# Patient Record
Sex: FEMALE | Race: ASIAN | NOT HISPANIC OR LATINO
[De-identification: names, ages, dates, MRNs, and addresses within clinical notes are randomized per-mention and may not be internally consistent; named-entity substitution may affect disease eponyms.]

---

## 2018-06-18 PROBLEM — Z00.00 ENCOUNTER FOR PREVENTIVE HEALTH EXAMINATION: Status: ACTIVE | Noted: 2018-06-18

## 2018-06-19 ENCOUNTER — APPOINTMENT (OUTPATIENT)
Dept: OBGYN | Facility: CLINIC | Age: 38
End: 2018-06-19
Payer: COMMERCIAL

## 2018-06-19 ENCOUNTER — ASOB RESULT (OUTPATIENT)
Age: 38
End: 2018-06-19

## 2018-06-19 ENCOUNTER — TRANSCRIPTION ENCOUNTER (OUTPATIENT)
Age: 38
End: 2018-06-19

## 2018-06-19 VITALS
SYSTOLIC BLOOD PRESSURE: 131 MMHG | HEIGHT: 62 IN | BODY MASS INDEX: 28.17 KG/M2 | DIASTOLIC BLOOD PRESSURE: 82 MMHG | HEART RATE: 71 BPM | WEIGHT: 153.06 LBS

## 2018-06-19 DIAGNOSIS — Z78.9 OTHER SPECIFIED HEALTH STATUS: ICD-10-CM

## 2018-06-19 PROCEDURE — 99203 OFFICE O/P NEW LOW 30 MIN: CPT

## 2018-06-19 PROCEDURE — 76817 TRANSVAGINAL US OBSTETRIC: CPT

## 2018-06-19 RX ORDER — MULTIVITAMIN
TABLET ORAL
Refills: 0 | Status: ACTIVE | COMMUNITY

## 2018-06-20 ENCOUNTER — TRANSCRIPTION ENCOUNTER (OUTPATIENT)
Age: 38
End: 2018-06-20

## 2018-06-20 ENCOUNTER — OUTPATIENT (OUTPATIENT)
Dept: OUTPATIENT SERVICES | Facility: HOSPITAL | Age: 38
LOS: 1 days | End: 2018-06-20
Payer: COMMERCIAL

## 2018-06-20 VITALS
TEMPERATURE: 98 F | WEIGHT: 151.9 LBS | DIASTOLIC BLOOD PRESSURE: 68 MMHG | HEIGHT: 62 IN | SYSTOLIC BLOOD PRESSURE: 102 MMHG | HEART RATE: 62 BPM | RESPIRATION RATE: 16 BRPM | OXYGEN SATURATION: 99 %

## 2018-06-20 DIAGNOSIS — O02.1 MISSED ABORTION: ICD-10-CM

## 2018-06-20 LAB
BLD GP AB SCN SERPL QL: NEGATIVE — SIGNIFICANT CHANGE UP
HCT VFR BLD CALC: 37.9 % — SIGNIFICANT CHANGE UP (ref 34.5–45)
HGB BLD-MCNC: 11.8 G/DL — SIGNIFICANT CHANGE UP (ref 11.5–15.5)
MCHC RBC-ENTMCNC: 24.7 PG — LOW (ref 27–34)
MCHC RBC-ENTMCNC: 31.1 % — LOW (ref 32–36)
MCV RBC AUTO: 79.3 FL — LOW (ref 80–100)
NRBC # FLD: 0 — SIGNIFICANT CHANGE UP
PLATELET # BLD AUTO: 282 K/UL — SIGNIFICANT CHANGE UP (ref 150–400)
PMV BLD: 11.1 FL — SIGNIFICANT CHANGE UP (ref 7–13)
RBC # BLD: 4.78 M/UL — SIGNIFICANT CHANGE UP (ref 3.8–5.2)
RBC # FLD: 14.2 % — SIGNIFICANT CHANGE UP (ref 10.3–14.5)
RH IG SCN BLD-IMP: POSITIVE — SIGNIFICANT CHANGE UP
WBC # BLD: 9.09 K/UL — SIGNIFICANT CHANGE UP (ref 3.8–10.5)
WBC # FLD AUTO: 9.09 K/UL — SIGNIFICANT CHANGE UP (ref 3.8–10.5)

## 2018-06-20 PROCEDURE — 93010 ELECTROCARDIOGRAM REPORT: CPT | Mod: 76

## 2018-06-20 RX ORDER — SODIUM CHLORIDE 9 MG/ML
1000 INJECTION, SOLUTION INTRAVENOUS
Qty: 0 | Refills: 0 | Status: DISCONTINUED | OUTPATIENT
Start: 2018-06-21 | End: 2018-06-22

## 2018-06-20 NOTE — H&P PST ADULT - LYMPHATIC
posterior cervical R/anterior cervical R/posterior cervical L/supraclavicular R/supraclavicular L/anterior cervical L

## 2018-06-20 NOTE — H&P PST ADULT - ACTIVITY
pt denies routine exercise ; pt is active pt denies routine exercise ; pt can walk 5 blocks last 6/19/18

## 2018-06-20 NOTE — H&P PST ADULT - PROBLEM SELECTOR PLAN 1
Dilation Vacuum Curettage    Pre op instructions given to pt pt appears to have a good understanding of pre op instructions Dilation Vacuum Curettage    Pre op instructions given to pt pt appears to have a good understanding of pre op instructions      reports h/o back, abdominal and chest pain occurring 6/19/18 lasting less than 5 minutes   pt also reports abdominal cramping ; pt denies bleeding  pt denies other c/o pain @ present ; pt in nad vss  ekg done 6/20/18    Reviewed with Dr Eldridge  Pt refused evaluation in Emergency Dept ; pt denies c/o at present    Pt instructed any further c/o ; go to nearest ED ; pt in agreement    Pt does not have a pcp Dilation Vacuum Curettage    Pre op instructions given to pt ;  pt appears to have a good understanding of pre op instructions      pt reports h/o back, abdominal and chest pain occurring 6/19/18 lasting "  less than 5 minutes " pt denies sob, denies palpitations   pt also reports abdominal cramping ; pt denies bleeding  pt denies other c/o pain @ present ; pt in nad vss  ekg done 6/20/18    Reviewed with Dr Eldridge  Pt offered  evaluation in Emergency Dept ;pt refused ;  pt denies c/o at present    Pt instructed any further c/o ; go to nearest ED ; pt in agreement    Pt does not have a pcp Dilation Vacuum Curettage    Pre op instructions given to pt ;  pt appears to have a good understanding of pre op instructions      pt reports h/o back, abdominal and chest pain occurring 6/19/18 lasting "  less than 5 minutes " pt denies sob, denies palpitations ; pt states walks 5 blocks last 6/19; pt denies cp, palpitations, sob with activity  pt also reports abdominal cramping ; pt denies bleeding  pt denies other c/o pain @ present ; pt in nad vss  ekg done 6/20/18    Reviewed with Dr Eldridge  Pt offered  evaluation in Emergency Dept ;pt refused ;  pt denies c/o at present    Pt instructed any further c/o ; go to nearest ED ; pt in agreement    Pt does not have a pcp Dilation Vacuum Curettage for Missed     Pre op instructions given to pt ;  pt appears to have a good understanding of pre op instructions      pt reports h/o back, abdominal and chest pain occurring 18 lasting "  less than 5 minutes " pt denies sob, denies palpitations ; pt states walks 5 blocks last ; pt denies cp, palpitations, sob with activity  pt also reports abdominal cramping ; pt denies bleeding  pt denies other c/o pain @ present ; pt in nad vss  ekg done 18    Reviewed with Dr Eldridge  Pt offered  evaluation in Emergency Dept ;pt refused ;  pt denies c/o at present    Pt instructed any further c/o ; go to nearest ED ; pt in agreement    Pt does not have a pcp

## 2018-06-20 NOTE — ASU PATIENT PROFILE, ADULT - ABLE TO REACH PT
Full message left with NPO instructions, 0930 arrival time and facility parking availablility. Patient made aware he/she needs to have a ride home and bring a picture ID and insurance card. Patient made aware not to bring any valuables or wear any contacts (female patients are encouraged not to wear any eye make-up). yes

## 2018-06-20 NOTE — H&P PST ADULT - NEGATIVE NEUROLOGICAL SYMPTOMS
no focal seizures/no syncope/no paresthesias/no generalized seizures/no transient paralysis/no weakness

## 2018-06-20 NOTE — H&P PST ADULT - MOUTH
Anesthetic History     History of awareness of surgery under anesthesia     Comments: During hysterectomy full recall and awakening       Risk Factors for Postoperative nausea/vomiting:       History of postoperative nausea/vomiting? NO       Female? YES       Motion sickness? NO       Intended opioid administration for postoperative analgesia? NO      Smoking Abstinence  Current Smoker? NO  Elective Surgery? YES  Seen preoperatively by anesthesiologist or proxy prior to day of surgery? YES  Pt abstained from smoking 24 hours prior to anesthesia?  YES     Review of Systems / Medical History  Patient summary reviewed and pertinent labs reviewed    Pulmonary        Sleep apnea: No treatment           Neuro/Psych         Neuromuscular disease and psychiatric history    Comments: polymyositis     [place]  Cardiovascular    Hypertension: poorly controlled          Hyperlipidemia    Exercise tolerance: >4 METS     GI/Hepatic/Renal     GERD: well controlled           Endo/Other    Diabetes: poorly controlled  Hypothyroidism: well controlled  Obesity and arthritis     Other Findings            Physical Exam    Airway  Mallampati: III  TM Distance: 4 - 6 cm  Neck ROM: normal range of motion        Cardiovascular    Rhythm: regular  Rate: normal         Dental    Dentition: Caps/crowns     Pulmonary  Breath sounds clear to auscultation               Abdominal  GI exam deferred       Other Findings            Anesthetic Plan    ASA: 3  Anesthesia type: MAC          Induction: Intravenous  Anesthetic plan and risks discussed with: Patient normal mouth and gums/moist

## 2018-06-20 NOTE — H&P PST ADULT - CARDIOVASCULAR COMMENTS
pt reports"  back, abdominal and chest pain occurring x 1 6/19 lasting less than  5 minutes ; pt offers no further c/o ; pt denies cp, palpitations , sob at present pt reports"  back, abdominal and chest pain occurring x 1;pt denies sob, denies palpitations  6/19 lasting less than  5 minutes ; pt offers no further c/o ; pt denies cp, palpitations , sob at present

## 2018-06-20 NOTE — H&P PST ADULT - HISTORY OF PRESENT ILLNESS
Pt is a 37 y.o. female ; pt reports LMP 3/21/18;pt reports 10 week pregnancy ; pt to GYN ; sonogram x 3 were done last 18 ; pt states " the fetus has not grown since 5 weeks ; there is no fetal heartbeat " Pt now presents for Dilation Vacuum Curettage for Missed  Pt is a 37 y.o. female ; pt reports LMP 3/21/18 ;pt reports 10 week pregnancy ; pt to GYN ; sonogram x 3 were done last 18 ; pt states " the fetus has not grown since 5 week ; there is no fetal heartbeat " Pt now presents for Dilation Vacuum Curettage for Missed  Pt is a 37 y.o. female ; pt reports LMP 3/21/18 ;pt reports 10 week pregnancy ; pt to GYN ; sonogram x 3 were done last 18 ; pt states " the fetus has not grown since  week 5  ; there is no fetal heartbeat " Pt now presents for Dilation Vacuum Curettage for Missed  Pt is a 37 y.o. female ; pt reports LMP 3/21/18 ;pt reports 10 week pregnancy ; pt to surgeon  ; sonogram x 3 were done last 18 ; pt states " the fetus has not grown since  week 5  ; there is no fetal heartbeat " Pt now presents for Dilation Vacuum Curettage for Missed

## 2018-06-20 NOTE — H&P PST ADULT - NSANTHOSAYNRD_GEN_A_CORE
No. MITESH screening performed.  STOP BANG Legend: 0-2 = LOW Risk; 3-4 = INTERMEDIATE Risk; 5-8 = HIGH Risk

## 2018-06-21 ENCOUNTER — RESULT REVIEW (OUTPATIENT)
Age: 38
End: 2018-06-21

## 2018-06-21 ENCOUNTER — APPOINTMENT (OUTPATIENT)
Dept: OBGYN | Facility: HOSPITAL | Age: 38
End: 2018-06-21

## 2018-06-21 ENCOUNTER — OUTPATIENT (OUTPATIENT)
Dept: OUTPATIENT SERVICES | Facility: HOSPITAL | Age: 38
LOS: 1 days | Discharge: HOME | End: 2018-06-21
Payer: COMMERCIAL

## 2018-06-21 VITALS
HEIGHT: 62 IN | RESPIRATION RATE: 14 BRPM | TEMPERATURE: 98 F | DIASTOLIC BLOOD PRESSURE: 64 MMHG | OXYGEN SATURATION: 99 % | WEIGHT: 151.02 LBS | SYSTOLIC BLOOD PRESSURE: 117 MMHG | HEART RATE: 75 BPM

## 2018-06-21 VITALS
RESPIRATION RATE: 15 BRPM | DIASTOLIC BLOOD PRESSURE: 78 MMHG | TEMPERATURE: 98 F | SYSTOLIC BLOOD PRESSURE: 116 MMHG | HEART RATE: 80 BPM | OXYGEN SATURATION: 99 %

## 2018-06-21 DIAGNOSIS — O02.1 MISSED ABORTION: ICD-10-CM

## 2018-06-21 LAB — RH IG SCN BLD-IMP: POSITIVE — SIGNIFICANT CHANGE UP

## 2018-06-21 PROCEDURE — 59820 CARE OF MISCARRIAGE: CPT | Mod: GC

## 2018-06-21 PROCEDURE — 88305 TISSUE EXAM BY PATHOLOGIST: CPT | Mod: 26

## 2018-06-21 RX ADMIN — SODIUM CHLORIDE 30 MILLILITER(S): 9 INJECTION, SOLUTION INTRAVENOUS at 13:00

## 2018-06-21 NOTE — ASU DISCHARGE PLAN (ADULT/PEDIATRIC). - ACTIVITY LEVEL
no douching/no tub baths/nothing per vagina/no tampons/no intercourse no exercise/no sports/gym/no tampons/nothing per vagina/no tub baths/no douching/no intercourse/no heavy lifting

## 2018-06-21 NOTE — ASU PREOP CHECKLIST - TEMPERATURE IN CELSIUS (DEGREES C)
SEEN IN CLINIC TODAY             This is to certify that Alba Parikh has been under my care on 2/12/2018.                 REMARKS:  Seen for sick evaluation.                _____________________________       Rodriguez Kingston M.D.       [facility]     36.8

## 2018-06-21 NOTE — ASU DISCHARGE PLAN (ADULT/PEDIATRIC). - ITEMS TO FOLLOWUP WITH YOUR PHYSICIAN'S
Please follow up with Dr. Coon in 2 weeks Please follow up with Dr. Coon in 2 weeks  Nothing in vagina, no intercourse, no douching, no tampons, no tub baths, and no swimming for about 2 weeks or until cleared by MD. Contact your doctor if you are soaking a pad every 30 minutes to an hour, experience clots or have SIGNS OF INFECTION:  FEVER >100.4 or have a foul smelling discharge.   Bereavement counseling/support group  are available in form of Social Work, Pastoral Care, Registered Nurses to help with your loss.  For information please call [phone]. Additionally, [email or web address]

## 2018-06-21 NOTE — ASU DISCHARGE PLAN (ADULT/PEDIATRIC). - COMMENTS
Surgical Unit will call you on the next business day to follow up. [facility] [place] is open Monday - Friday.

## 2018-06-21 NOTE — ASU DISCHARGE PLAN (ADULT/PEDIATRIC). - MEDICATION SUMMARY - MEDICATIONS TO TAKE
I will START or STAY ON the medications listed below when I get home from the hospital:    Tylenol Extra Strength 500 mg oral tablet  -- 2 tab(s) by mouth every 6 hours  -- Indication: For Mild pain    ibuprofen 200 mg oral tablet  -- 3 tab(s) by mouth every 6 hours  -- Indication: For Moderate pain

## 2018-06-21 NOTE — ASU DISCHARGE PLAN (ADULT/PEDIATRIC). - NOTIFY
Inability to Tolerate Liquids or Foods/Persistent Nausea and Vomiting/Pain not relieved by Medications/Unable to Urinate/Bleeding that does not stop/GYN Fever>100.4

## 2018-06-21 NOTE — ASU DISCHARGE PLAN (ADULT/PEDIATRIC). - NURSING INSTRUCTIONS
See medication reconciliation record  You were given Toradol for pain management. Please DO Not take Motrin/Ibuprofen (NSAIDS) for the next 6 hours (Until _5:45pm_).   You were given IV Tylenol for pain management.  Please DO NOT take tylenol for the next 6-8 hours (after 5:45pm ). Please do not exceed 3000mg in 24hours.

## 2018-06-22 ENCOUNTER — TRANSCRIPTION ENCOUNTER (OUTPATIENT)
Age: 38
End: 2018-06-22

## 2018-06-27 ENCOUNTER — OTHER (OUTPATIENT)
Age: 38
End: 2018-06-27

## 2018-06-27 ENCOUNTER — TRANSCRIPTION ENCOUNTER (OUTPATIENT)
Age: 38
End: 2018-06-27

## 2018-07-09 ENCOUNTER — APPOINTMENT (OUTPATIENT)
Dept: OBGYN | Facility: CLINIC | Age: 38
End: 2018-07-09
Payer: COMMERCIAL

## 2018-07-09 VITALS
HEART RATE: 76 BPM | HEIGHT: 62 IN | DIASTOLIC BLOOD PRESSURE: 84 MMHG | SYSTOLIC BLOOD PRESSURE: 154 MMHG | WEIGHT: 152 LBS | BODY MASS INDEX: 27.97 KG/M2

## 2018-07-09 VITALS — DIASTOLIC BLOOD PRESSURE: 78 MMHG | SYSTOLIC BLOOD PRESSURE: 119 MMHG

## 2018-07-09 DIAGNOSIS — Z48.89 ENCOUNTER FOR OTHER SPECIFIED SURGICAL AFTERCARE: ICD-10-CM

## 2018-07-09 DIAGNOSIS — O02.1 MISSED ABORTION: ICD-10-CM

## 2018-07-09 PROCEDURE — 99024 POSTOP FOLLOW-UP VISIT: CPT

## 2018-07-09 RX ORDER — IRON POLYSACCHARIDE COMPLEX 15MG/0.5ML
DROPS ORAL
Refills: 0 | Status: ACTIVE | COMMUNITY

## 2018-10-12 ENCOUNTER — TRANSCRIPTION ENCOUNTER (OUTPATIENT)
Age: 38
End: 2018-10-12

## 2019-10-10 NOTE — H&P PST ADULT - OPHTHALMOLOGIC
Date of visit: 10/10/2019      Chief Complaint   Patient presents with   • Throat Problem     sore throat that started yesterday       MA note appreciated and accepted.      HISTORY OF PRESENT ILLNESS:  Ms Jesusita Jones who is a pt of Taryn Sifuentes MD presents with c/o 1-2 day hx of store throat, nasal congestion, rhinorrhea & lots of yellow post nasal drip.  Gets this every year when the weather gets cold.  Feeling chilled.   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  Usually just self treats with warm fluids, but sometimes needs something more.  Has very large tonsils always - would love to get tonsils out.   Self care: hot liquids, theraflu     Chart Review:  Saw ENT in 4/2017.  Pt states trial of afrin was not helpful.       Past Medical History:   Diagnosis Date   • Gallstones 2011   • HTN (hypertension) 2006   • Miscarriage 2010         REVIEW OF SYSTEMS:  Constitutional: Denies fever  Cardiovascular: Denies   Respiratory: Denies cough, SOB, wheeze      EXAM:    Visit Vitals  /90   Pulse 80   Temp 98.6 °F (37 °C) (Oral)   Resp 16   Wt 83 kg   LMP 09/12/2019 (Exact Date)   SpO2 97%   Breastfeeding? No   BMI 32.42 kg/m²     General: pleasant 47 year old female in no acute distress but looks tired.  HEENT:  TMs normal, PERRL, nasal mucosa and turbinates 3-4 + swollen & red, oropharynx and tonsils 4+ swollen, no exudate seen; bilat tender superficial cervical lymphadenopathy  Cardiovascular: RRR, no murmur, no pedal edema  Respiratory: CTA bilateral, normal resp effort      ASSESSMENT:   1. Sore throat        PLAN:   Orders Placed This Encounter   • GROUP A STREP THROAT PCR  - Neg - pt notified per phone   TC to pt: We discussed that ENT states there are very strict rules for removing tonsils - most people do quite well despite large tonsils.    Discussed changes to watch for.  Symptomatic care: may add aleve, pt does not like ibuprofen, be careful of to much tylenol  since it is in the theraflu; consider chloraseptic - this does not work for her.  Declines need for steroid nasal spray saying her nose is open - it is just the sore throat that is a problem.       FU: prn    Patient states understanding and agreement with plan  Case discussed with ENT  Collaborating physician: Dr. Raffaele Garcia     details…

## 2019-11-18 ENCOUNTER — ASOB RESULT (OUTPATIENT)
Age: 39
End: 2019-11-18

## 2019-11-18 ENCOUNTER — INPATIENT (INPATIENT)
Facility: HOSPITAL | Age: 39
LOS: 2 days | Discharge: HOME | End: 2019-11-21
Attending: OBSTETRICS & GYNECOLOGY | Admitting: OBSTETRICS & GYNECOLOGY
Payer: COMMERCIAL

## 2019-11-18 ENCOUNTER — APPOINTMENT (OUTPATIENT)
Dept: ANTEPARTUM | Facility: CLINIC | Age: 39
End: 2019-11-18

## 2019-11-18 VITALS
HEART RATE: 66 BPM | DIASTOLIC BLOOD PRESSURE: 73 MMHG | HEIGHT: 62 IN | SYSTOLIC BLOOD PRESSURE: 129 MMHG | RESPIRATION RATE: 16 BRPM | TEMPERATURE: 98 F | WEIGHT: 182.1 LBS

## 2019-11-18 DIAGNOSIS — Z98.890 OTHER SPECIFIED POSTPROCEDURAL STATES: Chronic | ICD-10-CM

## 2019-11-18 DIAGNOSIS — Z3A.00 WEEKS OF GESTATION OF PREGNANCY NOT SPECIFIED: ICD-10-CM

## 2019-11-18 DIAGNOSIS — O26.899 OTHER SPECIFIED PREGNANCY RELATED CONDITIONS, UNSPECIFIED TRIMESTER: ICD-10-CM

## 2019-11-18 DIAGNOSIS — O36.5990 MATERNAL CARE FOR OTHER KNOWN OR SUSPECTED POOR FETAL GROWTH, UNSPECIFIED TRIMESTER, NOT APPLICABLE OR UNSPECIFIED: ICD-10-CM

## 2019-11-18 LAB
ALBUMIN SERPL ELPH-MCNC: 3.1 G/DL — LOW (ref 3.3–5)
ALP SERPL-CCNC: 200 U/L — HIGH (ref 40–120)
ALT FLD-CCNC: 13 U/L — SIGNIFICANT CHANGE UP (ref 4–33)
ANION GAP SERPL CALC-SCNC: 11 MMO/L — SIGNIFICANT CHANGE UP (ref 7–14)
APTT BLD: 29.5 SEC — SIGNIFICANT CHANGE UP (ref 27.5–36.3)
AST SERPL-CCNC: 20 U/L — SIGNIFICANT CHANGE UP (ref 4–32)
BASOPHILS # BLD AUTO: 0.04 K/UL — SIGNIFICANT CHANGE UP (ref 0–0.2)
BASOPHILS # BLD AUTO: 0.04 K/UL — SIGNIFICANT CHANGE UP (ref 0–0.2)
BASOPHILS NFR BLD AUTO: 0.3 % — SIGNIFICANT CHANGE UP (ref 0–2)
BASOPHILS NFR BLD AUTO: 0.4 % — SIGNIFICANT CHANGE UP (ref 0–2)
BILIRUB SERPL-MCNC: < 0.2 MG/DL — LOW (ref 0.2–1.2)
BLD GP AB SCN SERPL QL: NEGATIVE — SIGNIFICANT CHANGE UP
BUN SERPL-MCNC: 8 MG/DL — SIGNIFICANT CHANGE UP (ref 7–23)
CALCIUM SERPL-MCNC: 8.9 MG/DL — SIGNIFICANT CHANGE UP (ref 8.4–10.5)
CHLORIDE SERPL-SCNC: 102 MMOL/L — SIGNIFICANT CHANGE UP (ref 98–107)
CO2 SERPL-SCNC: 21 MMOL/L — LOW (ref 22–31)
CREAT SERPL-MCNC: 0.63 MG/DL — SIGNIFICANT CHANGE UP (ref 0.5–1.3)
EOSINOPHIL # BLD AUTO: 0.02 K/UL — SIGNIFICANT CHANGE UP (ref 0–0.5)
EOSINOPHIL # BLD AUTO: 0.03 K/UL — SIGNIFICANT CHANGE UP (ref 0–0.5)
EOSINOPHIL NFR BLD AUTO: 0.1 % — SIGNIFICANT CHANGE UP (ref 0–6)
EOSINOPHIL NFR BLD AUTO: 0.3 % — SIGNIFICANT CHANGE UP (ref 0–6)
FIBRINOGEN PPP-MCNC: 903 MG/DL — HIGH (ref 350–510)
GLUCOSE SERPL-MCNC: 85 MG/DL — SIGNIFICANT CHANGE UP (ref 70–99)
HCT VFR BLD CALC: 43.3 % — SIGNIFICANT CHANGE UP (ref 34.5–45)
HCT VFR BLD CALC: 43.6 % — SIGNIFICANT CHANGE UP (ref 34.5–45)
HGB BLD-MCNC: 13.6 G/DL — SIGNIFICANT CHANGE UP (ref 11.5–15.5)
HGB BLD-MCNC: 14.5 G/DL — SIGNIFICANT CHANGE UP (ref 11.5–15.5)
IMM GRANULOCYTES NFR BLD AUTO: 0.7 % — SIGNIFICANT CHANGE UP (ref 0–1.5)
IMM GRANULOCYTES NFR BLD AUTO: 0.8 % — SIGNIFICANT CHANGE UP (ref 0–1.5)
INR BLD: 0.87 — LOW (ref 0.88–1.17)
LDH SERPL L TO P-CCNC: 192 U/L — SIGNIFICANT CHANGE UP (ref 135–225)
LYMPHOCYTES # BLD AUTO: 15.3 % — SIGNIFICANT CHANGE UP (ref 13–44)
LYMPHOCYTES # BLD AUTO: 2.16 K/UL — SIGNIFICANT CHANGE UP (ref 1–3.3)
LYMPHOCYTES # BLD AUTO: 2.24 K/UL — SIGNIFICANT CHANGE UP (ref 1–3.3)
LYMPHOCYTES # BLD AUTO: 20.6 % — SIGNIFICANT CHANGE UP (ref 13–44)
MCHC RBC-ENTMCNC: 26 PG — LOW (ref 27–34)
MCHC RBC-ENTMCNC: 26.9 PG — LOW (ref 27–34)
MCHC RBC-ENTMCNC: 31.4 % — LOW (ref 32–36)
MCHC RBC-ENTMCNC: 33.3 % — SIGNIFICANT CHANGE UP (ref 32–36)
MCV RBC AUTO: 80.7 FL — SIGNIFICANT CHANGE UP (ref 80–100)
MCV RBC AUTO: 82.8 FL — SIGNIFICANT CHANGE UP (ref 80–100)
MONOCYTES # BLD AUTO: 0.58 K/UL — SIGNIFICANT CHANGE UP (ref 0–0.9)
MONOCYTES # BLD AUTO: 0.73 K/UL — SIGNIFICANT CHANGE UP (ref 0–0.9)
MONOCYTES NFR BLD AUTO: 5 % — SIGNIFICANT CHANGE UP (ref 2–14)
MONOCYTES NFR BLD AUTO: 5.5 % — SIGNIFICANT CHANGE UP (ref 2–14)
NEUTROPHILS # BLD AUTO: 11.5 K/UL — HIGH (ref 1.8–7.4)
NEUTROPHILS # BLD AUTO: 7.62 K/UL — HIGH (ref 1.8–7.4)
NEUTROPHILS NFR BLD AUTO: 72.4 % — SIGNIFICANT CHANGE UP (ref 43–77)
NEUTROPHILS NFR BLD AUTO: 78.6 % — HIGH (ref 43–77)
NRBC # FLD: 0 K/UL — SIGNIFICANT CHANGE UP (ref 0–0)
NRBC # FLD: 0 K/UL — SIGNIFICANT CHANGE UP (ref 0–0)
PLATELET # BLD AUTO: 211 K/UL — SIGNIFICANT CHANGE UP (ref 150–400)
PLATELET # BLD AUTO: 218 K/UL — SIGNIFICANT CHANGE UP (ref 150–400)
PMV BLD: 11.1 FL — SIGNIFICANT CHANGE UP (ref 7–13)
PMV BLD: 12.5 FL — SIGNIFICANT CHANGE UP (ref 7–13)
POTASSIUM SERPL-MCNC: 3.9 MMOL/L — SIGNIFICANT CHANGE UP (ref 3.5–5.3)
POTASSIUM SERPL-SCNC: 3.9 MMOL/L — SIGNIFICANT CHANGE UP (ref 3.5–5.3)
PROT SERPL-MCNC: 6.5 G/DL — SIGNIFICANT CHANGE UP (ref 6–8.3)
PROTHROM AB SERPL-ACNC: 9.6 SEC — LOW (ref 9.8–13.1)
RBC # BLD: 5.23 M/UL — HIGH (ref 3.8–5.2)
RBC # BLD: 5.4 M/UL — HIGH (ref 3.8–5.2)
RBC # FLD: 14.7 % — HIGH (ref 10.3–14.5)
RBC # FLD: 15 % — HIGH (ref 10.3–14.5)
RH IG SCN BLD-IMP: POSITIVE — SIGNIFICANT CHANGE UP
SODIUM SERPL-SCNC: 134 MMOL/L — LOW (ref 135–145)
URATE SERPL-MCNC: 5.7 MG/DL — SIGNIFICANT CHANGE UP (ref 2.5–7)
WBC # BLD: 10.51 K/UL — HIGH (ref 3.8–10.5)
WBC # BLD: 14.63 K/UL — HIGH (ref 3.8–10.5)
WBC # FLD AUTO: 10.51 K/UL — HIGH (ref 3.8–10.5)
WBC # FLD AUTO: 14.63 K/UL — HIGH (ref 3.8–10.5)

## 2019-11-18 RX ORDER — OXYTOCIN 10 UNIT/ML
333.33 VIAL (ML) INJECTION
Qty: 20 | Refills: 0 | Status: DISCONTINUED | OUTPATIENT
Start: 2019-11-18 | End: 2019-11-19

## 2019-11-18 RX ORDER — MAGNESIUM SULFATE 500 MG/ML
4 VIAL (ML) INJECTION ONCE
Refills: 0 | Status: COMPLETED | OUTPATIENT
Start: 2019-11-18 | End: 2019-11-18

## 2019-11-18 RX ORDER — SODIUM CHLORIDE 9 MG/ML
1000 INJECTION, SOLUTION INTRAVENOUS
Refills: 0 | Status: DISCONTINUED | OUTPATIENT
Start: 2019-11-18 | End: 2019-11-19

## 2019-11-18 RX ORDER — ACETAMINOPHEN 500 MG
2 TABLET ORAL
Qty: 0 | Refills: 0 | DISCHARGE

## 2019-11-18 RX ORDER — INFLUENZA VIRUS VACCINE 15; 15; 15; 15 UG/.5ML; UG/.5ML; UG/.5ML; UG/.5ML
0.5 SUSPENSION INTRAMUSCULAR ONCE
Refills: 0 | Status: DISCONTINUED | OUTPATIENT
Start: 2019-11-18 | End: 2019-11-21

## 2019-11-18 RX ORDER — IBUPROFEN 200 MG
3 TABLET ORAL
Qty: 0 | Refills: 0 | DISCHARGE

## 2019-11-18 RX ORDER — MAGNESIUM SULFATE 500 MG/ML
2 VIAL (ML) INJECTION
Qty: 40 | Refills: 0 | Status: DISCONTINUED | OUTPATIENT
Start: 2019-11-18 | End: 2019-11-19

## 2019-11-18 RX ORDER — LABETALOL HCL 100 MG
20 TABLET ORAL ONCE
Refills: 0 | Status: COMPLETED | OUTPATIENT
Start: 2019-11-18 | End: 2019-11-18

## 2019-11-18 RX ADMIN — Medication 200 GRAM(S): at 21:16

## 2019-11-18 RX ADMIN — Medication 50 GM/HR: at 21:42

## 2019-11-18 RX ADMIN — Medication 20 MILLIGRAM(S): at 20:44

## 2019-11-18 NOTE — PROGRESS NOTE ADULT - SUBJECTIVE AND OBJECTIVE BOX
C/O contraction , requesting epidural , No  headache , blurring vision , epigastric pain     BP- 160/100 , repeat 130/80     Fetal tracing  initially catagory 2 , now russell gory 1     toco- contraction 3/min   V/E- 1cm/90%/-2     Plan   s/P labetalol Iv push #1  on Mgso4  for Pre-eclampsia     expectant management   epidural for pain   will consider cervical balloon  if needed

## 2019-11-18 NOTE — OB PROVIDER TRIAGE NOTE - NSOBPROVIDERNOTE_OBGYN_ALL_OB_FT
39 y/o  @ 37.3 wks gest presents from  Dr Montes De Oca's office IUGR @11 %with AC @ less than 10% VERN: 5 denies any uc's vb or lof reports +FM denies any n/v/d denies any fever or chills ap care uncomplicated thus far      abdomen: soft, nt on palp  prolonged monitoring in progress    T(C): 36.9 (19 @ 15:15), Max: 36.9 (19 @ 15:15)  HR: 67 (19 @ 15:56) (66 - 73)  BP: 128/80 (19 @ 15:56) (128/80 - 129/76)  RR: 16 (19 @ 15:15) (16 - 16)  SpO2: --      NKA  med hx: denies  surg hx:   D&C x's 1  gyn hx: denies  ob hx:  SAB x's 2  1- complete  1- incomplete  meds : PNV    will continue to monitor     ht: 5'2  wt:182 lbs 39 y/o  @ 37.3 wks gest presents from  Dr Montes De Oca's office IUGR @11 %with AC @ less than 10% VERN: 5 denies any uc's vb or lof reports +FM denies any n/v/d denies any fever or chills ap care uncomplicated thus far      abdomen: soft, nt on palp  prolonged monitoring in progress    T(C): 36.9 (19 @ 15:15), Max: 36.9 (19 @ 15:15)  HR: 67 (19 @ 15:56) (66 - 73)  BP: 128/80 (19 @ 15:56) (128/80 - 129/76)  RR: 16 (19 @ 15:15) (16 - 16)  SpO2: --      NKA  med hx: denies  surg hx:   D&C x's 1  gyn hx: denies  ob hx:  SAB x's 2  1- complete  1- incomplete  meds : PNV    will continue to monitor     ht: 5'2  wt:182 lbs    cat 1 FHT  toco: irreg   TAS by Kriss Yi [facility]   BPP:  vtx EFW: 2421 grams 5% VERN: 11.34  umbilical artery doppler normal  GBS- neg 2019  SVE: closed and posterior Dr Montes De Oca in the office     plan of care d/w dr LINDSEY Esquivel d/w dr Foreman M fellow/ dr jiang   admit to L&D  IUGR @ 37.3 wks gest for po cytotec IOL  see admission orders

## 2019-11-19 ENCOUNTER — TRANSCRIPTION ENCOUNTER (OUTPATIENT)
Age: 39
End: 2019-11-19

## 2019-11-19 LAB
HCT VFR BLD CALC: 40.6 % — SIGNIFICANT CHANGE UP (ref 34.5–45)
HCT VFR BLD CALC: 43.3 % — SIGNIFICANT CHANGE UP (ref 34.5–45)
HGB BLD-MCNC: 13 G/DL — SIGNIFICANT CHANGE UP (ref 11.5–15.5)
HGB BLD-MCNC: 14.3 G/DL — SIGNIFICANT CHANGE UP (ref 11.5–15.5)
MAGNESIUM SERPL-MCNC: 4.4 MG/DL — HIGH (ref 1.6–2.6)
MAGNESIUM SERPL-MCNC: 5.5 MG/DL — HIGH (ref 1.6–2.6)
MAGNESIUM SERPL-MCNC: 6.2 MG/DL — HIGH (ref 1.6–2.6)
MAGNESIUM SERPL-MCNC: 6.2 MG/DL — HIGH (ref 1.6–2.6)
MCHC RBC-ENTMCNC: 26.3 PG — LOW (ref 27–34)
MCHC RBC-ENTMCNC: 26.4 PG — LOW (ref 27–34)
MCHC RBC-ENTMCNC: 32 % — SIGNIFICANT CHANGE UP (ref 32–36)
MCHC RBC-ENTMCNC: 33 % — SIGNIFICANT CHANGE UP (ref 32–36)
MCV RBC AUTO: 80 FL — SIGNIFICANT CHANGE UP (ref 80–100)
MCV RBC AUTO: 82 FL — SIGNIFICANT CHANGE UP (ref 80–100)
NRBC # FLD: 0 K/UL — SIGNIFICANT CHANGE UP (ref 0–0)
NRBC # FLD: 0 K/UL — SIGNIFICANT CHANGE UP (ref 0–0)
PLATELET # BLD AUTO: 197 K/UL — SIGNIFICANT CHANGE UP (ref 150–400)
PLATELET # BLD AUTO: 229 K/UL — SIGNIFICANT CHANGE UP (ref 150–400)
PMV BLD: 11.8 FL — SIGNIFICANT CHANGE UP (ref 7–13)
PMV BLD: 12.5 FL — SIGNIFICANT CHANGE UP (ref 7–13)
RBC # BLD: 4.95 M/UL — SIGNIFICANT CHANGE UP (ref 3.8–5.2)
RBC # BLD: 5.41 M/UL — HIGH (ref 3.8–5.2)
RBC # FLD: 14.9 % — HIGH (ref 10.3–14.5)
RBC # FLD: 15 % — HIGH (ref 10.3–14.5)
T PALLIDUM AB TITR SER: NEGATIVE — SIGNIFICANT CHANGE UP
WBC # BLD: 18.56 K/UL — HIGH (ref 3.8–10.5)
WBC # BLD: 21.16 K/UL — HIGH (ref 3.8–10.5)
WBC # FLD AUTO: 18.56 K/UL — HIGH (ref 3.8–10.5)
WBC # FLD AUTO: 21.16 K/UL — HIGH (ref 3.8–10.5)

## 2019-11-19 PROCEDURE — 93010 ELECTROCARDIOGRAM REPORT: CPT

## 2019-11-19 RX ORDER — AER TRAVELER 0.5 G/1
1 SOLUTION RECTAL; TOPICAL EVERY 4 HOURS
Refills: 0 | Status: DISCONTINUED | OUTPATIENT
Start: 2019-11-19 | End: 2019-11-21

## 2019-11-19 RX ORDER — MAGNESIUM HYDROXIDE 400 MG/1
30 TABLET, CHEWABLE ORAL
Refills: 0 | Status: DISCONTINUED | OUTPATIENT
Start: 2019-11-19 | End: 2019-11-21

## 2019-11-19 RX ORDER — OXYCODONE HYDROCHLORIDE 5 MG/1
5 TABLET ORAL ONCE
Refills: 0 | Status: DISCONTINUED | OUTPATIENT
Start: 2019-11-19 | End: 2019-11-21

## 2019-11-19 RX ORDER — KETOROLAC TROMETHAMINE 30 MG/ML
30 SYRINGE (ML) INJECTION ONCE
Refills: 0 | Status: DISCONTINUED | OUTPATIENT
Start: 2019-11-19 | End: 2019-11-19

## 2019-11-19 RX ORDER — ACETAMINOPHEN 500 MG
975 TABLET ORAL
Refills: 0 | Status: DISCONTINUED | OUTPATIENT
Start: 2019-11-19 | End: 2019-11-21

## 2019-11-19 RX ORDER — TETANUS TOXOID, REDUCED DIPHTHERIA TOXOID AND ACELLULAR PERTUSSIS VACCINE, ADSORBED 5; 2.5; 8; 8; 2.5 [IU]/.5ML; [IU]/.5ML; UG/.5ML; UG/.5ML; UG/.5ML
0.5 SUSPENSION INTRAMUSCULAR ONCE
Refills: 0 | Status: DISCONTINUED | OUTPATIENT
Start: 2019-11-19 | End: 2019-11-21

## 2019-11-19 RX ORDER — MAGNESIUM SULFATE 500 MG/ML
2 VIAL (ML) INJECTION
Qty: 40 | Refills: 0 | Status: DISCONTINUED | OUTPATIENT
Start: 2019-11-19 | End: 2019-11-20

## 2019-11-19 RX ORDER — DIBUCAINE 1 %
1 OINTMENT (GRAM) RECTAL EVERY 6 HOURS
Refills: 0 | Status: DISCONTINUED | OUTPATIENT
Start: 2019-11-19 | End: 2019-11-21

## 2019-11-19 RX ORDER — SODIUM CHLORIDE 9 MG/ML
500 INJECTION, SOLUTION INTRAVENOUS ONCE
Refills: 0 | Status: COMPLETED | OUTPATIENT
Start: 2019-11-19 | End: 2019-11-19

## 2019-11-19 RX ORDER — PRAMOXINE HYDROCHLORIDE 150 MG/15G
1 AEROSOL, FOAM RECTAL EVERY 4 HOURS
Refills: 0 | Status: DISCONTINUED | OUTPATIENT
Start: 2019-11-19 | End: 2019-11-21

## 2019-11-19 RX ORDER — LANOLIN
1 OINTMENT (GRAM) TOPICAL EVERY 6 HOURS
Refills: 0 | Status: DISCONTINUED | OUTPATIENT
Start: 2019-11-19 | End: 2019-11-21

## 2019-11-19 RX ORDER — OXYTOCIN 10 UNIT/ML
333.33 VIAL (ML) INJECTION
Qty: 20 | Refills: 0 | Status: DISCONTINUED | OUTPATIENT
Start: 2019-11-19 | End: 2019-11-19

## 2019-11-19 RX ORDER — OXYTOCIN 10 UNIT/ML
2 VIAL (ML) INJECTION
Qty: 30 | Refills: 0 | Status: DISCONTINUED | OUTPATIENT
Start: 2019-11-18 | End: 2019-11-19

## 2019-11-19 RX ORDER — DIPHENHYDRAMINE HCL 50 MG
25 CAPSULE ORAL EVERY 6 HOURS
Refills: 0 | Status: DISCONTINUED | OUTPATIENT
Start: 2019-11-19 | End: 2019-11-21

## 2019-11-19 RX ORDER — IBUPROFEN 200 MG
600 TABLET ORAL EVERY 6 HOURS
Refills: 0 | Status: DISCONTINUED | OUTPATIENT
Start: 2019-11-19 | End: 2019-11-21

## 2019-11-19 RX ORDER — IBUPROFEN 200 MG
600 TABLET ORAL EVERY 6 HOURS
Refills: 0 | Status: COMPLETED | OUTPATIENT
Start: 2019-11-19 | End: 2020-10-17

## 2019-11-19 RX ORDER — GLYCERIN ADULT
1 SUPPOSITORY, RECTAL RECTAL AT BEDTIME
Refills: 0 | Status: DISCONTINUED | OUTPATIENT
Start: 2019-11-19 | End: 2019-11-21

## 2019-11-19 RX ORDER — SODIUM CHLORIDE 9 MG/ML
1000 INJECTION, SOLUTION INTRAVENOUS
Refills: 0 | Status: DISCONTINUED | OUTPATIENT
Start: 2019-11-19 | End: 2019-11-20

## 2019-11-19 RX ORDER — SODIUM CHLORIDE 9 MG/ML
3 INJECTION INTRAMUSCULAR; INTRAVENOUS; SUBCUTANEOUS EVERY 8 HOURS
Refills: 0 | Status: DISCONTINUED | OUTPATIENT
Start: 2019-11-19 | End: 2019-11-21

## 2019-11-19 RX ORDER — HYDROCORTISONE 1 %
1 OINTMENT (GRAM) TOPICAL EVERY 6 HOURS
Refills: 0 | Status: DISCONTINUED | OUTPATIENT
Start: 2019-11-19 | End: 2019-11-21

## 2019-11-19 RX ORDER — OXYTOCIN 10 UNIT/ML
16.67 VIAL (ML) INJECTION
Qty: 20 | Refills: 0 | Status: DISCONTINUED | OUTPATIENT
Start: 2019-11-19 | End: 2019-11-19

## 2019-11-19 RX ORDER — OXYCODONE HYDROCHLORIDE 5 MG/1
5 TABLET ORAL
Refills: 0 | Status: DISCONTINUED | OUTPATIENT
Start: 2019-11-19 | End: 2019-11-21

## 2019-11-19 RX ORDER — BENZOCAINE 10 %
1 GEL (GRAM) MUCOUS MEMBRANE EVERY 6 HOURS
Refills: 0 | Status: DISCONTINUED | OUTPATIENT
Start: 2019-11-19 | End: 2019-11-21

## 2019-11-19 RX ORDER — SIMETHICONE 80 MG/1
80 TABLET, CHEWABLE ORAL EVERY 4 HOURS
Refills: 0 | Status: DISCONTINUED | OUTPATIENT
Start: 2019-11-19 | End: 2019-11-21

## 2019-11-19 RX ADMIN — SODIUM CHLORIDE 3 MILLILITER(S): 9 INJECTION INTRAMUSCULAR; INTRAVENOUS; SUBCUTANEOUS at 06:33

## 2019-11-19 RX ADMIN — Medication 1 TABLET(S): at 12:22

## 2019-11-19 RX ADMIN — Medication 600 MILLIGRAM(S): at 22:59

## 2019-11-19 RX ADMIN — Medication 50 GM/HR: at 10:23

## 2019-11-19 RX ADMIN — Medication 975 MILLIGRAM(S): at 18:15

## 2019-11-19 RX ADMIN — SODIUM CHLORIDE 3 MILLILITER(S): 9 INJECTION INTRAMUSCULAR; INTRAVENOUS; SUBCUTANEOUS at 12:28

## 2019-11-19 RX ADMIN — Medication 50 GM/HR: at 22:53

## 2019-11-19 RX ADMIN — Medication 600 MILLIGRAM(S): at 21:59

## 2019-11-19 RX ADMIN — Medication 2 MILLIUNIT(S)/MIN: at 00:18

## 2019-11-19 RX ADMIN — Medication 30 MILLIGRAM(S): at 04:32

## 2019-11-19 RX ADMIN — SODIUM CHLORIDE 3 MILLILITER(S): 9 INJECTION INTRAMUSCULAR; INTRAVENOUS; SUBCUTANEOUS at 22:00

## 2019-11-19 RX ADMIN — Medication 975 MILLIGRAM(S): at 19:00

## 2019-11-19 RX ADMIN — Medication 975 MILLIGRAM(S): at 06:43

## 2019-11-19 RX ADMIN — Medication 30 MILLIGRAM(S): at 04:36

## 2019-11-19 RX ADMIN — Medication 975 MILLIGRAM(S): at 06:35

## 2019-11-19 RX ADMIN — Medication 50 MILLIUNIT(S)/MIN: at 07:28

## 2019-11-19 RX ADMIN — SODIUM CHLORIDE 1000 MILLILITER(S): 9 INJECTION, SOLUTION INTRAVENOUS at 05:06

## 2019-11-19 RX ADMIN — Medication 50 GM/HR: at 07:11

## 2019-11-19 NOTE — OB RN DELIVERY SUMMARY - NS_SEPSISRSKCALC_OBGYN_ALL_OB_FT
EOS calculated successfully. EOS Risk Factor: 0.5/1000 live births ([facility] national incidence); GA=37w4d; Temp=98.4; ROM=4.6; GBS='Negative'; Antibiotics='No antibiotics or any antibiotics < 2 hrs prior to birth'

## 2019-11-19 NOTE — OB NEONATOLOGY/PEDIATRICIAN DELIVERY SUMMARY - NSPEDSNEONOTESA_OBGYN_ALL_OB_FT
Baby is a 37.4 wk GA male born to a 37 y/o  mother via . Peds called to delivery for cat II tracing and IUGR. Maternal history of HTN and Pre-eclampsia; Mom started on Mg. Prenatal history of IUGR,  BPP. Maternal blood type O+.  PNL negative, non-reactive, and immune. GBS negative on . SROM at 2144 on  clear fluids. Baby born vigorous and crying spontaneously. Warmed, dried, stimulated. Apgars 9/9. EOS 0.12. Mom plans to breastfeed and bottlefeed, would like hepB and declines circ.  BW: 2340 (SGA)

## 2019-11-19 NOTE — DISCHARGE NOTE OB - CARE PROVIDER_API CALL
Hanna Montes De Oca)  Obstetrics and Gynecology  [address] [place] [address]  Phone: [phone]  Fax: [phone]  Follow Up Time:

## 2019-11-19 NOTE — DISCHARGE NOTE OB - PATIENT PORTAL LINK FT
You can access the FollowMyHealth Patient Portal offered by [facility] by registering at the following website: [email or web address]. By joining [employer]’s FollowMyHealth portal, you will also be able to view your health information using other applications (apps) compatible with our system.

## 2019-11-19 NOTE — DISCHARGE NOTE OB - MATERIALS PROVIDED
Vaccinations/Breastfeeding Mother’s Support Group Information/Shaken Baby Prevention Handout/Guide to Postpartum Care/[facility] Hearing Screen Program/[facility]  Screening Program/Breastfeeding Log/Back To Sleep Handout/Bottle Feeding Log/Tdap Vaccination (VIS Pub Date: 2012)/Breastfeeding Guide and Packet/Birth Certificate Instructions/  Immunization Record

## 2019-11-19 NOTE — DISCHARGE NOTE OB - CARE PLAN
Principal Discharge DX:	 (normal spontaneous vaginal delivery)  Goal:	GOOD RECOVERY  Assessment and plan of treatment:	F/U 2 weeks

## 2019-11-19 NOTE — OB POSTPARTUM EVENT NOTE - NS_EVENTSUMMARY1_OBGYN_ALL_OB_FT
Pt tachycardic in 110s. Pt s/p  at 0220, . Pt SPEC on Mag. Pt denies SOB, chest pain, HA, dizziness.

## 2019-11-19 NOTE — OB POSTPARTUM EVENT NOTE - NS_EVENTFINDINGS2_OBGYN_ALL_OB_FT
Discussed with Dr Barber. Pt to receive 500cc LR bolus and another CBC @7777. Will continue to monitor.

## 2019-11-19 NOTE — OB POSTPARTUM EVENT NOTE - NS_EVENTFINDINGS1_OBGYN_ALL_OB_FT
Discussed with Dr Barber. CBC already sent on patient for sPEC. As per Dr Barber pt to be ordered for EKG. Dr Cervantes at bedside to assess patient. Will continue to monitor.

## 2019-11-20 RX ADMIN — Medication 600 MILLIGRAM(S): at 06:28

## 2019-11-20 RX ADMIN — Medication 600 MILLIGRAM(S): at 11:00

## 2019-11-20 RX ADMIN — Medication 975 MILLIGRAM(S): at 20:48

## 2019-11-20 RX ADMIN — Medication 600 MILLIGRAM(S): at 23:00

## 2019-11-20 RX ADMIN — Medication 600 MILLIGRAM(S): at 05:35

## 2019-11-20 RX ADMIN — Medication 975 MILLIGRAM(S): at 02:07

## 2019-11-20 RX ADMIN — SODIUM CHLORIDE 3 MILLILITER(S): 9 INJECTION INTRAMUSCULAR; INTRAVENOUS; SUBCUTANEOUS at 18:49

## 2019-11-20 RX ADMIN — Medication 600 MILLIGRAM(S): at 17:00

## 2019-11-20 RX ADMIN — Medication 1 TABLET(S): at 18:48

## 2019-11-20 RX ADMIN — Medication 600 MILLIGRAM(S): at 18:48

## 2019-11-20 RX ADMIN — SODIUM CHLORIDE 3 MILLILITER(S): 9 INJECTION INTRAMUSCULAR; INTRAVENOUS; SUBCUTANEOUS at 05:02

## 2019-11-20 RX ADMIN — Medication 975 MILLIGRAM(S): at 01:07

## 2019-11-20 RX ADMIN — Medication 975 MILLIGRAM(S): at 21:48

## 2019-11-20 RX ADMIN — Medication 600 MILLIGRAM(S): at 09:47

## 2019-11-20 NOTE — PROGRESS NOTE ADULT - SUBJECTIVE AND OBJECTIVE BOX
ANESTHESIA POSTOP CHECK    38y Female POSTOP DAY 1     No COMPLAINTS    NO APPARENT ANESTHESIA COMPLICATIONS

## 2019-11-20 NOTE — PROGRESS NOTE ADULT - PROBLEM SELECTOR PLAN 1
- s/p Mg for sPEC  - Pain well controlled, continue current pain regimen  - Increase ambulation, SCDs when not ambulating  - Continue regular diet    Gonzalo Agudelo PGY1

## 2019-11-20 NOTE — LACTATION INITIAL EVALUATION - INTERVENTION OUTCOME
nbn demonstrated  deep latch and  performed  with sucking and swallowing  noted . reviewed strategies to keep nbn awake. offered assistance as needed./verbalizes understanding

## 2019-11-20 NOTE — LACTATION INITIAL EVALUATION - LACTATION INTERVENTIONS
initiate hand expression routine/assisted with deep latch and positioning . discussed  signs  of  effective  feeding and  swallowing.  discussed  compression at  breast when  nbn  stops  drinking  and  is  still sucking.  instructed  to offer both  breast at a feeding ,feed on cue and safe  skin to skin. .reviewed  late   .  if  nbn  not  breastfeeding  effectively  hand  express  and  pump  and   give  teaspoons  between  feedings alternative  feeding   method. ./initiate skin to skin

## 2019-11-20 NOTE — PROGRESS NOTE ADULT - SUBJECTIVE AND OBJECTIVE BOX
OB Progress Note:  PPD#1    S: 39yo  PPD#1 s/p , pregnancy complicated by sPEC s/p magensium for seizure prophyalxis.  Patient feels well. Pain is well controlled. She is tolerating a regular diet and passing flatus. She is voiding spontaneously, and ambulating without difficulty. Denies CP/SOB. Denies lightheadedness/dizziness. Denies N/V. Denies any headaches, blurry vision, epigastric pain, heavy vaginal beleding or passage of large clots.     O:  Vitals:  Vital Signs Last 24 Hrs  T(C): 36.6 (2019 05:40), Max: 36.9 (2019 10:00)  T(F): 97.8 (2019 05:40), Max: 98.4 (2019 10:00)  HR: 73 (2019 05:40) (73 - 110)  BP: 124/84 (2019 05:40) (116/78 - 136/80)  BP(mean): --  RR: 18 (2019 05:40) (16 - 18)  SpO2: 100% (2019 05:40) (96% - 100%)    MEDICATIONS  (STANDING):  acetaminophen   Tablet .. 975 milliGRAM(s) Oral <User Schedule>  diphtheria/tetanus/pertussis (acellular) Vaccine (ADAcel) 0.5 milliLiter(s) IntraMuscular once  ibuprofen  Tablet. 600 milliGRAM(s) Oral every 6 hours  influenza   Vaccine 0.5 milliLiter(s) IntraMuscular once  lactated ringers. 1000 milliLiter(s) (50 mL/Hr) IV Continuous <Continuous>  prenatal multivitamin 1 Tablet(s) Oral daily  sodium chloride 0.9% lock flush 3 milliLiter(s) IV Push every 8 hours      Labs:  Blood type: O Positive  Rubella IgG: RPR: Negative              Physical Exam:  General: NAD  Abdomen: soft, non-tender, non-distended, fundus firm  Vaginal: Lochia wnl  Extremities: No erythema/edema    .

## 2019-11-21 VITALS
DIASTOLIC BLOOD PRESSURE: 73 MMHG | SYSTOLIC BLOOD PRESSURE: 134 MMHG | OXYGEN SATURATION: 98 % | HEART RATE: 59 BPM | TEMPERATURE: 98 F | RESPIRATION RATE: 18 BRPM

## 2019-11-21 RX ORDER — IBUPROFEN 200 MG
1 TABLET ORAL
Qty: 0 | Refills: 0 | DISCHARGE
Start: 2019-11-21

## 2019-11-21 RX ORDER — ACETAMINOPHEN 500 MG
3 TABLET ORAL
Qty: 0 | Refills: 0 | DISCHARGE
Start: 2019-11-21

## 2019-11-21 RX ADMIN — SODIUM CHLORIDE 3 MILLILITER(S): 9 INJECTION INTRAMUSCULAR; INTRAVENOUS; SUBCUTANEOUS at 02:22

## 2019-11-21 RX ADMIN — Medication 600 MILLIGRAM(S): at 06:28

## 2019-11-21 RX ADMIN — Medication 600 MILLIGRAM(S): at 05:34

## 2019-11-21 RX ADMIN — Medication 600 MILLIGRAM(S): at 00:00

## 2019-11-21 NOTE — PROGRESS NOTE ADULT - PROBLEM SELECTOR PLAN 1
- Pain well controlled, continue current pain regimen  - S/p Mg   - Increase ambulation, SCDs when not ambulating  - Continue regular diet  - Discharge planning     Gonzalo Agudelo PGY1

## 2019-11-21 NOTE — PROGRESS NOTE ADULT - SUBJECTIVE AND OBJECTIVE BOX
S: Patient doing well. Minimal lochia. Pain controlled.  Post Partum day :  O: Vital Signs Last 24 Hrs  T(C): 36.8 (2019 06:27), Max: 37.1 (2019 18:04)  T(F): 98.2 (2019 06:27), Max: 98.8 (2019 18:04)  HR: 59 (2019 06:27) (59 - 95)  BP: 134/73 (2019 06:27) (130/71 - 146/79)  BP(mean): --  RR: 18 (2019 06:27) (18 - 18)  SpO2: 98% (2019 06:27) (98% - 100%)    Gen: No acute distress  Abd: soft, NT,  fundus firm below umbilicus  Lochia:Normal  Ext: no tenderness    Labs:      A: 38y PPD # 2 s/p  doing well.    Plan: Discharge home

## 2019-11-21 NOTE — PROGRESS NOTE ADULT - SUBJECTIVE AND OBJECTIVE BOX
OB Progress Note:  PPD#2    S: 37yo PPD#2 s/p , pregnancy complicated by sPEC s/p Mg for seizure prophylaxis. Patient feels well. Pain is well controlled. She is tolerating a regular diet and passing flatus. She is voiding spontaneously, and ambulating without difficulty. Denies CP/SOB. Denies lightheadedness/dizziness. Denies N/V. Denies any heavy vaginal bleeding or passage of large clots.     O:  Vitals:   Vital Signs Last 24 Hrs  T(C): 36.8 (2019 06:27), Max: 37.1 (2019 18:04)  T(F): 98.2 (2019 06:27), Max: 98.8 (2019 18:04)  HR: 59 (2019 06:27) (59 - 95)  BP: 134/73 (2019 06:27) (116/76 - 146/79)  BP(mean): --  RR: 18 (2019 06:27) (17 - 18)  SpO2: 98% (2019 06:27) (98% - 100%)    MEDICATIONS  (STANDING):  acetaminophen   Tablet .. 975 milliGRAM(s) Oral <User Schedule>  diphtheria/tetanus/pertussis (acellular) Vaccine (ADAcel) 0.5 milliLiter(s) IntraMuscular once  ibuprofen  Tablet. 600 milliGRAM(s) Oral every 6 hours  influenza   Vaccine 0.5 milliLiter(s) IntraMuscular once  prenatal multivitamin 1 Tablet(s) Oral daily  sodium chloride 0.9% lock flush 3 milliLiter(s) IV Push every 8 hours    MEDICATIONS  (PRN):  benzocaine 20%/menthol 0.5% Spray 1 Spray(s) Topical every 6 hours PRN for Perineal discomfort  dibucaine 1% Ointment 1 Application(s) Topical every 6 hours PRN Perineal discomfort  diphenhydrAMINE 25 milliGRAM(s) Oral every 6 hours PRN Pruritus  glycerin Suppository - Adult 1 Suppository(s) Rectal at bedtime PRN Constipation  hydrocortisone 1% Cream 1 Application(s) Topical every 6 hours PRN Moderate Pain (4-6)  lanolin Ointment 1 Application(s) Topical every 6 hours PRN nipple soreness  magnesium hydroxide Suspension 30 milliLiter(s) Oral two times a day PRN Constipation  oxyCODONE    IR 5 milliGRAM(s) Oral every 3 hours PRN Moderate to Severe Pain (4-10)  oxyCODONE    IR 5 milliGRAM(s) Oral once PRN Moderate to Severe Pain (4-10)  pramoxine 1%/zinc 5% Cream 1 Application(s) Topical every 4 hours PRN Moderate Pain (4-6)  simethicone 80 milliGRAM(s) Chew every 4 hours PRN Gas  witch hazel Pads 1 Application(s) Topical every 4 hours PRN Perineal discomfort      Labs:  Blood type: O Positive  Rubella IgG: RPR: Negative                          13.0   21.16<H> >-----------< 197    (  @ 05:45 )             40.6                        14.3   18.56<H> >-----------< 229    (  @ 03:39 )             43.3                        14.5   14.63<H> >-----------< 218    (  @ 20:45 )             43.6                        13.6   10.51<H> >-----------< 211    (  @ 18:14 )             43.3              Physical Exam:  General: NAD  Abdomen: soft, non-tender, non-distended, fundus firm  Vaginal: Lochia wnl  Extremities: No erythema/edema

## 2019-11-22 ENCOUNTER — INPATIENT (INPATIENT)
Facility: HOSPITAL | Age: 39
LOS: 0 days | Discharge: HOME | End: 2019-11-23
Attending: OBSTETRICS & GYNECOLOGY | Admitting: OBSTETRICS & GYNECOLOGY
Payer: COMMERCIAL

## 2019-11-22 DIAGNOSIS — Z98.890 OTHER SPECIFIED POSTPROCEDURAL STATES: Chronic | ICD-10-CM

## 2019-11-22 PROBLEM — O03.9 COMPLETE OR UNSPECIFIED SPONTANEOUS ABORTION WITHOUT COMPLICATION: Chronic | Status: ACTIVE | Noted: 2019-11-18

## 2019-11-22 LAB
ALBUMIN SERPL ELPH-MCNC: 3.1 G/DL — LOW (ref 3.3–5)
ALP SERPL-CCNC: 132 U/L — HIGH (ref 40–120)
ALT FLD-CCNC: 31 U/L — SIGNIFICANT CHANGE UP (ref 4–33)
ANION GAP SERPL CALC-SCNC: 11 MMO/L — SIGNIFICANT CHANGE UP (ref 7–14)
APPEARANCE UR: SIGNIFICANT CHANGE UP
APTT BLD: 29.1 SEC — SIGNIFICANT CHANGE UP (ref 27.5–36.3)
AST SERPL-CCNC: 34 U/L — HIGH (ref 4–32)
BACTERIA # UR AUTO: SIGNIFICANT CHANGE UP
BASOPHILS # BLD AUTO: 0.05 K/UL — SIGNIFICANT CHANGE UP (ref 0–0.2)
BASOPHILS NFR BLD AUTO: 0.5 % — SIGNIFICANT CHANGE UP (ref 0–2)
BILIRUB SERPL-MCNC: < 0.2 MG/DL — LOW (ref 0.2–1.2)
BILIRUB UR-MCNC: NEGATIVE — SIGNIFICANT CHANGE UP
BLOOD UR QL VISUAL: SIGNIFICANT CHANGE UP
BUN SERPL-MCNC: 9 MG/DL — SIGNIFICANT CHANGE UP (ref 7–23)
CALCIUM SERPL-MCNC: 8.8 MG/DL — SIGNIFICANT CHANGE UP (ref 8.4–10.5)
CHLORIDE SERPL-SCNC: 103 MMOL/L — SIGNIFICANT CHANGE UP (ref 98–107)
CO2 SERPL-SCNC: 25 MMOL/L — SIGNIFICANT CHANGE UP (ref 22–31)
COLOR SPEC: SIGNIFICANT CHANGE UP
CREAT ?TM UR-MCNC: 42.2 MG/DL — SIGNIFICANT CHANGE UP
CREAT SERPL-MCNC: 0.69 MG/DL — SIGNIFICANT CHANGE UP (ref 0.5–1.3)
EOSINOPHIL # BLD AUTO: 0.18 K/UL — SIGNIFICANT CHANGE UP (ref 0–0.5)
EOSINOPHIL NFR BLD AUTO: 1.8 % — SIGNIFICANT CHANGE UP (ref 0–6)
EPI CELLS # UR: SIGNIFICANT CHANGE UP
FIBRINOGEN PPP-MCNC: 716 MG/DL — HIGH (ref 350–510)
GLUCOSE SERPL-MCNC: 85 MG/DL — SIGNIFICANT CHANGE UP (ref 70–99)
GLUCOSE UR-MCNC: NEGATIVE — SIGNIFICANT CHANGE UP
HCT VFR BLD CALC: 37 % — SIGNIFICANT CHANGE UP (ref 34.5–45)
HGB BLD-MCNC: 11.7 G/DL — SIGNIFICANT CHANGE UP (ref 11.5–15.5)
IMM GRANULOCYTES NFR BLD AUTO: 1.3 % — SIGNIFICANT CHANGE UP (ref 0–1.5)
INR BLD: 0.83 — LOW (ref 0.88–1.17)
KETONES UR-MCNC: NEGATIVE — SIGNIFICANT CHANGE UP
LDH SERPL L TO P-CCNC: 215 U/L — SIGNIFICANT CHANGE UP (ref 135–225)
LEUKOCYTE ESTERASE UR-ACNC: HIGH
LYMPHOCYTES # BLD AUTO: 2.62 K/UL — SIGNIFICANT CHANGE UP (ref 1–3.3)
LYMPHOCYTES # BLD AUTO: 26.5 % — SIGNIFICANT CHANGE UP (ref 13–44)
MCHC RBC-ENTMCNC: 26.6 PG — LOW (ref 27–34)
MCHC RBC-ENTMCNC: 31.6 % — LOW (ref 32–36)
MCV RBC AUTO: 84.1 FL — SIGNIFICANT CHANGE UP (ref 80–100)
MONOCYTES # BLD AUTO: 0.53 K/UL — SIGNIFICANT CHANGE UP (ref 0–0.9)
MONOCYTES NFR BLD AUTO: 5.4 % — SIGNIFICANT CHANGE UP (ref 2–14)
NEUTROPHILS # BLD AUTO: 6.37 K/UL — SIGNIFICANT CHANGE UP (ref 1.8–7.4)
NEUTROPHILS NFR BLD AUTO: 64.5 % — SIGNIFICANT CHANGE UP (ref 43–77)
NITRITE UR-MCNC: NEGATIVE — SIGNIFICANT CHANGE UP
NRBC # FLD: 0 K/UL — SIGNIFICANT CHANGE UP (ref 0–0)
PH UR: 8 — SIGNIFICANT CHANGE UP (ref 5–8)
PLATELET # BLD AUTO: 243 K/UL — SIGNIFICANT CHANGE UP (ref 150–400)
PMV BLD: 11.4 FL — SIGNIFICANT CHANGE UP (ref 7–13)
POTASSIUM SERPL-MCNC: 4.2 MMOL/L — SIGNIFICANT CHANGE UP (ref 3.5–5.3)
POTASSIUM SERPL-SCNC: 4.2 MMOL/L — SIGNIFICANT CHANGE UP (ref 3.5–5.3)
PROT SERPL-MCNC: 6.1 G/DL — SIGNIFICANT CHANGE UP (ref 6–8.3)
PROT UR-MCNC: 23.7 MG/DL — SIGNIFICANT CHANGE UP
PROT UR-MCNC: 30 — SIGNIFICANT CHANGE UP
PROTHROM AB SERPL-ACNC: 9.2 SEC — LOW (ref 9.8–13.1)
RBC # BLD: 4.4 M/UL — SIGNIFICANT CHANGE UP (ref 3.8–5.2)
RBC # FLD: 14.9 % — HIGH (ref 10.3–14.5)
RBC CASTS # UR COMP ASSIST: >50 — HIGH (ref 0–?)
SODIUM SERPL-SCNC: 139 MMOL/L — SIGNIFICANT CHANGE UP (ref 135–145)
SP GR SPEC: 1.01 — SIGNIFICANT CHANGE UP (ref 1–1.04)
URATE SERPL-MCNC: 5.3 MG/DL — SIGNIFICANT CHANGE UP (ref 2.5–7)
UROBILINOGEN FLD QL: NORMAL — SIGNIFICANT CHANGE UP
WBC # BLD: 9.88 K/UL — SIGNIFICANT CHANGE UP (ref 3.8–10.5)
WBC # FLD AUTO: 9.88 K/UL — SIGNIFICANT CHANGE UP (ref 3.8–10.5)
WBC UR QL: HIGH (ref 0–?)

## 2019-11-22 NOTE — CHART NOTE - NSCHARTNOTEFT_GEN_A_CORE
38y.o. [place] female  s/p  on 2019 at 37.4weeks  Patient presents to OB triage today for elevated blood pressures at home.  Patient reports persistent blood pressure readings of systolic blood pressure readings of 150.  Patient also reports diastolic readings at home 80-90's throughout the course of the day.  Patient then reports that she then experienced a mild headache yesterday that was mild and frontal in nature; 3/10 pain that was relieved by motrin.  Patient presently denies any headache, changes in vision, SOB, RUQ pain or n/v.  Patient reports that during intrapartum labor she experienced elevated blood pressures and diagnosis Pre-Eclampsia with severe features was made.  Patient was started on magnesium sulfate therapy and continued to receive post partum. 38y.o. [place] female  s/p  on 2019 at 37.4weeks  Patient presents to OB triage today for elevated blood pressures at home.  Patient reports persistent blood pressure readings of systolic blood pressure readings of 150.  Patient also reports diastolic readings at home 80-90's throughout the course of the day.  Patient then reports that she then experienced a mild headache yesterday that was mild and frontal in nature; 3/10 pain that was relieved by motrin.  Patient presently denies any headache, changes in vision, SOB, RUQ pain or n/v.  Patient reports that during intrapartum labor she experienced elevated blood pressures and diagnosis Pre-Eclampsia with severe features was made.  Patient was started on magnesium sulfate therapy and continued to receive post partum.      Assessment  Vital Signs  BP: 146/71 HR: 72  158/98; 66  148/93; 65  138/76; 60  139/81; 58  155/94; 65  146/87; 57  141/74; 56   147/83; 58      Abdomen soft nontender  non gravid  no evidence of severe range blood pressures at this time   no evidence of severe features at this time    fundus firm at u; moderate lochia visualized in zoltan pad    CBC Full  -  ( 2019 22:59 )  WBC Count : 9.88 K/uL  RBC Count : 4.40 M/uL  Hemoglobin : 11.7 g/dL  Hematocrit : 37.0 %  Platelet Count - Automated : 243 K/uL  Mean Cell Volume : 84.1 fL  Mean Cell Hemoglobin : 26.6 pg  Mean Cell Hemoglobin Concentration : 31.6 %  Auto Neutrophil # : 6.37 K/uL  Auto Lymphocyte # : 2.62 K/uL  Auto Monocyte # : 0.53 K/uL  Auto Eosinophil # : 0.18 K/uL  Auto Basophil # : 0.05 K/uL  Auto Neutrophil % : 64.5 %  Auto Lymphocyte % : 26.5 %  Auto Monocyte % : 5.4 %  Auto Eosinophil % : 1.8 %  Auto Basophil % : 0.5 %        139  |  103  |  9   ----------------------------<  85  4.2   |  25  |  0.69    Ca    8.8      2019 22:59    TPro  6.1  /  Alb  3.1<L>  /  TBili  < 0.2<L>  /  DBili  x   /  AST  34<H>  /  ALT  31  /  AlkPhos  132<H>  11-22      Urinalysis Basic - ( 2019 22:59 )    Color: BROWN / Appearance: Lt TURBID / S.010 / pH: 8.0  Gluc: NEGATIVE / Ketone: NEGATIVE  / Bili: NEGATIVE / Urobili: NORMAL   Blood: LARGE / Protein: 30 / Nitrite: NEGATIVE   Leuk Esterase: LARGE / RBC: >50 / WBC 11-25   Sq Epi: x / Non Sq Epi: FEW / Bacteria: FEW    PCR: 0.5 38y.o. [place] female  s/p  on 2019 at 37.4weeks  Patient presents to OB triage today for elevated blood pressures at home.  Patient reports persistent blood pressure readings of systolic blood pressure readings of 150.  Patient also reports diastolic readings at home 80-90's throughout the course of the day.  Patient then reports that she then experienced a mild headache yesterday that was mild and frontal in nature; 3/10 pain that was relieved by motrin.  Patient presently denies any headache, changes in vision, SOB, RUQ pain or n/v.  Patient reports that during intrapartum labor she experienced elevated blood pressures and diagnosis Pre-Eclampsia with severe features was made.  Patient was started on magnesium sulfate therapy and continued to receive post partum.      Assessment  Vital Signs  BP: 146/71 HR: 72  158/98; 66  148/93; 65  138/76; 60  139/81; 58  155/94; 65  146/87; 57  141/74; 56   147/83; 58      Abdomen soft nontender  non gravid  no evidence of severe range blood pressures at this time   no evidence of severe features at this time    fundus firm at u; moderate lochia visualized in zoltan pad    CBC Full  -  ( 2019 22:59 )  WBC Count : 9.88 K/uL  RBC Count : 4.40 M/uL  Hemoglobin : 11.7 g/dL  Hematocrit : 37.0 %  Platelet Count - Automated : 243 K/uL  Mean Cell Volume : 84.1 fL  Mean Cell Hemoglobin : 26.6 pg  Mean Cell Hemoglobin Concentration : 31.6 %  Auto Neutrophil # : 6.37 K/uL  Auto Lymphocyte # : 2.62 K/uL  Auto Monocyte # : 0.53 K/uL  Auto Eosinophil # : 0.18 K/uL  Auto Basophil # : 0.05 K/uL  Auto Neutrophil % : 64.5 %  Auto Lymphocyte % : 26.5 %  Auto Monocyte % : 5.4 %  Auto Eosinophil % : 1.8 %  Auto Basophil % : 0.5 %        139  |  103  |  9   ----------------------------<  85  4.2   |  25  |  0.69    Ca    8.8      2019 22:59    TPro  6.1  /  Alb  3.1<L>  /  TBili  < 0.2<L>  /  DBili  x   /  AST  34<H>  /  ALT  31  /  AlkPhos  132<H>  11-22      Urinalysis Basic - ( 2019 22:59 )    Color: BROWN / Appearance: Lt TURBID / S.010 / pH: 8.0  Gluc: NEGATIVE / Ketone: NEGATIVE  / Bili: NEGATIVE / Urobili: NORMAL   Blood: LARGE / Protein: 30 / Nitrite: NEGATIVE   Leuk Esterase: LARGE / RBC: >50 / WBC 11-25   Sq Epi: x / Non Sq Epi: FEW / Bacteria: FEW    PCR: 0.5    case reviewed with Dr. Marti  no evidence of pre-eclampsia at this time  isolated severe range reviewed with Dr. Marti     gestational hypertension at this time  patient cleared for discharge home ambulatory and stable  no evidence of severe features at this time  patient educated on signs and symptoms to report to triage/ provider  patient to follow up in office on Monday    reviewed signs and symptoms of pre-eclampsia  patient to continue to take blood pressures at home; noted to record findings  patient to be initiated on labetalol 200 bid    Discharge d/w Dr. Marti

## 2019-11-23 PROCEDURE — 99221 1ST HOSP IP/OBS SF/LOW 40: CPT

## 2019-11-23 RX ORDER — LABETALOL HCL 100 MG
1 TABLET ORAL
Qty: 10 | Refills: 0
Start: 2019-11-23 | End: 2019-11-27

## 2020-01-22 NOTE — DISCHARGE NOTE OB - BIRTH CERTIFICATE COMPLETED
Take Zofran as needed for nausea vomiting.  Make sure to take your Keppra daily to help with seizures.  Drink plenty of fluids.  Eat small bland meals and advance as tolerated.  Practice good hand hygiene.    Follow-up with primary care as needed for new or worsening concerns as well as blood pressure check in the next 4 weeks.  Call, reschedule appointment with Dr. Saavedra for new patient consultation regarding her hemangioma, spot on her liver.    Return the ER for new or worsening symptoms, increased abdominal pain, nausea, vomiting, diarrhea, new onset of chest pain, shortness of breath headache or fever.   Yes

## 2020-09-10 NOTE — OB RN DELIVERY SUMMARY - NS_FINALEDD_OBGYN_ALL_OB_DT
06-Dec-2019
CC/Reason For referral: Chest Pain; Shortness of breathe  Preferred Consultant(if applicable): Palmira Bucio  Who admits for you (if needed): Palmira  Do you have documents you would like to fax over?  Would you still like to speak to an ED attending? Yes

## 2021-06-24 NOTE — OB PROVIDER TRIAGE NOTE - NSINFECTIONS_OBGYN_ALL_OB
Physical Therapy    Facility/Department: [facility]  Initial Assessment    NAME: Penny Knapp  : 1947  MRN: [id number]    Date of Service: 2021      Attending Provider:  Farrah Finley DO    Evaluating PT:  Cadence Mcleod. Becky James P.T. Room #:    Diagnosis:  No admission diagnoses are documented for this encounter. Pt came in due to increased weakness since COVID vaccine and bleeding around PEG site. Pertinent PMHx/PSHx:  Paraplegic, CVA, severe B knee OA, PEG tube  Precautions:  Falls, bed/chair alarm when no one is with pt. SUBJECTIVE:    Pt lives with her son. Her son reports she hasn't used a adriana lift since 2020. He states she assists during transfers and can take small steps to transfer to and from surfaces. Son states that since she received her COVID vaccine she has become weaker and now during transfers her legs have been buckling. Pt uses a WC for mobility and son assists. OBJECTIVE:   Initial Evaluation  Date: 21 Treatment Short Term/ Long Term   Goals   Was pt agreeable to Eval/treatment? yes     Does pt have pain? No c/o pain     Bed Mobility  Rolling: MAX A  Supine to sit: MAX A  Sit to supine: MAX A  Scooting: MAX A  MIN A   Transfers Sit to stand: MOD A x2 from elevated ER cart. Stand to sit: MOD A x2  Stand pivot: NA  MOD A   Ambulation   NA, pt was unable to move her feet at this time. 3 feet with no AD and MOD A    Stair negotiation: ascended and descended NA  NA   AM-PAC 6 Clicks 10/63       BLE ROM is limited due to increased tone BLE. BLE strength is grossly 2/5 to 3-/5.    Balance: sitting is SBA on edge of cart and standing with no AD is MOD A x2  Endurance: fair-    Patient education  Pt educated on transfers    Patient response to education:   Pt verbalized understanding Pt demonstrated skill Pt requires further education in this area   yes yes yes ASSESSMENT:    Conditions Requiring Skilled Therapeutic Intervention:    [x]Decreased strength     [x]Decreased ROM  [x]Decreased functional mobility  [x]Decreased balance   [x]Decreased endurance   []Decreased posture  []Decreased sensation  []Decreased coordination   []Decreased vision  []Decreased safety awareness   []Increased pain       Comments:  Pt has become weaker over the last month per son. He would like for his mother to work with therapy to increase her strength to previous baseline which was be able to transfer with assist and mobilize with a WC. Pt has decreased strength and endurance making her a higher fall risk at this time. Pt was left supine on ER cart with son present. Pt's/ family goals   1. Son would like his mom to return to her baseline level of functional mobility which is assist with transfers and mobilization with a WC. Patient and or family understand(s) diagnosis, prognosis, and plan of care. PHYSICAL THERAPY PLAN OF CARE:    PT POC is established based on physician order and patient diagnosis     Referring provider/PT Order:  PT eval and treat  Diagnosis:  No admission diagnoses are documented for this encounter. Specific instructions for next treatment:  Work on transfers.      Current Treatment Recommendations:     [x] Strengthening to improve independence with functional mobility   [x] To improve ROM which will help make functional mobility easier and required less assist  [x] Balance Training to improve static/dynamic balance and to reduce fall risk  [x] Endurance Training to improve activity tolerance during functional mobility   [x] Transfer Training to improve safety and independence with all functional transfers   [x] Gait Training to improve gait mechanics, endurance and assess need for appropriate assistive device  [] Stair Training in preparation for safe discharge home and/or into the community   [] Positioning to prevent skin breakdown and contractures  [] Safety and Education Training   [x] Patient/Caregiver Education   [] HEP  [] Other     PT long term treatment goals are located in above grid    Frequency of treatments: 2-5x/week x 1-2 weeks. Time in  14:30  Time out  14:45    Evaluation Time includes thorough review of current medical information, gathering information on past medical history/social history and prior level of function, completion of standardized testing/informal observation of tasks, assessment of data and education on plan of care and goals. CPT codes:  [x] Low Complexity PT evaluation 78697  [] Moderate Complexity PT evaluation 87195  [] High Complexity PT evaluation 06269  [] PT Re-evaluation 89478  [] Gait training 32948 ** minutes  [] Manual therapy 18436 ** minutes  [] Therapeutic activities 64943 ** minutes  [] Therapeutic exercises 16580 ** minutes  [] Neuromuscular reeducation 28859 ** minutes     Kishore Verma., P.T.   License Number: [id number] Unknown at this time

## 2021-12-15 ENCOUNTER — NEW PATIENT (OUTPATIENT)
Dept: URBAN - METROPOLITAN AREA CLINIC 79 | Facility: CLINIC | Age: 41
End: 2021-12-15

## 2021-12-15 DIAGNOSIS — H00.023: ICD-10-CM

## 2021-12-15 PROCEDURE — 99203 OFFICE O/P NEW LOW 30 MIN: CPT

## 2021-12-15 ASSESSMENT — VISUAL ACUITY
OS_SC: 20/20
OD_SC: 20/20

## 2021-12-15 ASSESSMENT — TONOMETRY
OS_IOP_MMHG: 09
OD_IOP_MMHG: 10

## 2022-11-11 NOTE — ASU PREOP CHECKLIST - VIA
Diet, Clear Liquid:   Consistent Carbohydrate {Evening Snack} (CSTCHOSN)  No Concentrated Potassium  No Concentrated Phosphorus  Low Sodium (11-09-22 @ 17:09) [Active]       ambulate

## 2023-03-08 NOTE — LACTATION INITIAL EVALUATION - INFANT ACTIVITY
Subjective   Patient ID: Daiana is a 44 year old female.    Chief Complaint   Patient presents with   • Gyn Exam     C/o ovarian cyst with last menstrual cycle      43 yo here for annual exam.   Pap utd  mammo utd  Had hep c screening w PCP  No depression  Recent severe midcycle pain which resolved with period. Thinks she had ovulatory cyst. No current pain. Just finishingher period today.         Daiana has a past medical history of Acid reflux, ADHD, Anxiety disorder, Depression, Eczema, Hernia, inguinal, Lumbar disc disease, No known problems, and Rosacea, acne.  Daiana has Rosacea; Migraine; History of gestational diabetes mellitus (GDM); Gastroesophageal reflux disease with esophagitis; Class 1 obesity due to excess calories without serious comorbidity in adult; Atypical mole; Acid reflux; and Eczema on their problem list.  Daiana has a past surgical history that includes Cholecystectomy;  section, low transverse; Eye surgery; and ERCP.  Her family history includes Cancer, Colon in her paternal grandfather; Diabetes in her maternal grandfather; Heart in her maternal grandmother; Heart disease in her father; Kidney disease in her maternal grandfather, mother, and paternal grandfather; Non-Hodgkin's Lymphoma in her mother; Patient is unaware of any medical problems in her half-brother, son, and son; Pneumonia in her paternal grandmother; Systemic Lupus Erythematosus in her mother.  Daiana reports that she quit smoking about 17 years ago. Her smoking use included cigarettes. She has never used smokeless tobacco. She reports current alcohol use of about 2.0 standard drinks per week. She reports that she does not use drugs.  Daiana has a current medication list which includes the following prescription(s): topiramate, sertraline, sumatriptan, sumatriptan, amphetamine-dextroamphetamine xr, UNKNOWN TO PATIENT, and esomeprazole.  Daiana   Current Outpatient Medications   Medication Sig Dispense Refill   • topiramate  (TOPAMAX) 100 MG tablet 100 mg  in the morning and 100 mg in the evening.     • sertraline (ZOLOFT) 50 MG tablet Take 50 mg by mouth daily.     • SUMAtriptan (IMITREX) 25 MG tablet Take 25 mg by mouth as needed for Migraine. Take 1 tablet by mouth at onset of migraine. May repeat after 2 hours if needed.     • SUMAtriptan (IMITREX) 50 MG tablet TAKE 1 TABLET BY MOUTH 1 TIME FOR UP TO 1 DOSE AS NEEDED FOR MIGRAINE OR HEADACHE. MAY REPEAT DOSE IN 2 HOURS IF RESPONSE NOT OBTAINED     • amphetamine-dextroamphetamine XR (ADDERALL XR) 15 MG 24 hr capsule Take by mouth daily.     • UNKNOWN TO PATIENT Indications: CBD gummies      • esomeprazole (NexIUM) 20 MG capsule Take 40 mg by mouth daily (before breakfast).       No current facility-administered medications for this visit.     Daiana is allergic to latex, codeine, and wound dressing adhesive.    Review of Systems   Constitutional: Negative for activity change, appetite change, chills, fatigue, fever and unexpected weight change.   HENT: Negative for sore throat.    Respiratory: Negative for shortness of breath, wheezing and stridor.    Cardiovascular: Negative for chest pain, palpitations and leg swelling.   Gastrointestinal: Negative for abdominal distention, abdominal pain, anal bleeding, blood in stool, constipation, diarrhea, nausea and vomiting.   Endocrine: Negative for cold intolerance, heat intolerance and polyuria.   Genitourinary: Positive for pelvic pain. Negative for difficulty urinating, dyspareunia, dysuria, genital sores, menstrual problem, urgency, vaginal bleeding, vaginal discharge and vaginal pain.   Musculoskeletal: Negative for myalgias.   Skin: Negative for color change.   Allergic/Immunologic: Negative for immunocompromised state.   Neurological: Negative for weakness and headaches.   Hematological: Does not bruise/bleed easily.   Psychiatric/Behavioral: Negative for decreased concentration and suicidal ideas. The patient is not nervous/anxious.         Objective   Physical Exam  Constitutional:       General: She is not in acute distress.     Appearance: She is well-developed. She is not diaphoretic.   Genitourinary:      Vulva, bladder and urethral meatus normal.      Right Labia: No rash, tenderness or lesions.     Left Labia: No tenderness, lesions or rash.     No inguinal adenopathy present in the right or left side.     No vaginal discharge, tenderness or ulceration.      No vaginal prolapse present.     No vaginal atrophy present.       Right Adnexa: not tender and no mass present.     Left Adnexa: not tender and no mass present.     No cervical motion tenderness or friability.      Uterus is not tender.      No uterine mass detected.     No urethral tenderness present.      Levator ani not tender, no asymmetrical contractions present and no pelvic spasms present.     Pelvic exam was performed with patient in the lithotomy position.   Rectum:      No anal fissure.   Breasts:     Right: No mass, nipple discharge, skin change or tenderness.      Left: No mass, nipple discharge, skin change or tenderness.   HENT:      Head: Normocephalic and atraumatic.      Nose: Nose normal.   Eyes:      General: No scleral icterus.     Conjunctiva/sclera: Conjunctivae normal.   Neck:      Thyroid: No thyromegaly.      Trachea: Trachea normal. No tracheal deviation.   Cardiovascular:      Rate and Rhythm: Normal rate and regular rhythm.      Pulses: Normal pulses.   Pulmonary:      Effort: Pulmonary effort is normal. No accessory muscle usage, respiratory distress or retractions.      Breath sounds: No stridor.   Chest:      Chest wall: No tenderness.   Abdominal:      General: There is no distension.      Palpations: Abdomen is soft. There is no hepatomegaly, splenomegaly or mass.      Tenderness: There is no abdominal tenderness.      Hernia: No hernia is present. There is no hernia in the left inguinal area.   Musculoskeletal:         General: No tenderness or  deformity.      Cervical back: Normal range of motion and neck supple.      Right lower leg: No edema.      Left lower leg: No edema.   Lymphadenopathy:      Cervical: No cervical adenopathy.      Upper Body:      Right upper body: No supraclavicular, axillary or pectoral adenopathy.      Left upper body: No supraclavicular, axillary or pectoral adenopathy.      Lower Body: No right inguinal and no right inguinal adenopathy. No left inguinal and no left inguinal adenopathy.   Neurological:      General: No focal deficit present.      Mental Status: She is alert and oriented to person, place, and time.      Gait: Gait is intact.   Skin:     General: Skin is warm and dry.      Nails: There is no clubbing.   Psychiatric:         Attention and Perception: Attention normal.         Mood and Affect: Mood normal.         Speech: Speech normal.         Behavior: Behavior normal.         Thought Content: Thought content normal.         Cognition and Memory: Cognition normal.         Judgment: Judgment normal.   Vitals reviewed.         Assessment   Problem List Items Addressed This Visit    None  Visit Diagnoses     Gynecologic exam normal    -  Primary    Encounter for screening mammogram for malignant neoplasm of breast        Relevant Orders    MAMMO SCREENING BILATERAL W ALIX    Pelvic pain in female        Relevant Orders    US PELVIS NON-OB EXTERNAL TRANSABDOMINAL AND INTERNAL TRANSVAGINAL          Pap smears and HPV testing as according to ACOG guidelines.  Mammograms yearly  Continue with yearly breast and pelvic exams.  Continue on  condoms     Health Maintenance Summary     Hepatitis B Vaccine (1 of 3 - 3-dose series)  Overdue - never done    COVID-19 Vaccine (1)  Overdue - never done    Varicella Vaccine (1 of 2 - 2-dose childhood series)  Overdue - never done    Hepatitis C Screening (Once)  Postponed until 3/7/2024    Depression Screening (Yearly)  Next due on 2/28/2024    DTaP/Tdap/Td Vaccine (2 - Td or Tdap)   Next due on 6/3/2025    Cervical Cancer Screen 30-64 - (PAP/HPV Co-Testing - Every 5 Years)  Next due on 5/28/2026    Influenza Vaccine   Completed    Meningococcal Vaccine   Aged Out    HPV Vaccine   Aged Out    Pneumococcal Vaccine 0-64   Aged Out                 active

## 2025-03-31 NOTE — ASU PREOP CHECKLIST - HEART RATE (BEATS/MIN)
Name: Kassidy Curran      : 1977      MRN: [id number]  Encounter Provider: SULMA Paredes  Encounter Date: 3/31/2025   Encounter department: [facility]  :  Assessment & Plan  Severe major depressive disorder (HCC)  Depression score 1. Doing much better on increased medication dose.   Continue effexor 75mg daily  Anticipatory guidance.          Anxiety  Continue effexor 75mg daily.   Stress management. Activities to divert attention when possible.   Conscious breathing techniques as discussed.   Coping mechanisms and strategies vary from person to person so try to utilize strategies that you think may work for you (such as meditation, music, etc. ).  Anti anxiety/depression medications as prescribed.          Patient was counseled regarding instructions for management which included: impression/diagnosis, risk/benefits of treatment options, importance of compliance with treatment, risk factor reduction, and prognosis.   I have reviewed the instructions with the patient answering all questions and patient verbalized understanding.         History of Present Illness   Here for follow up/med check  On effexor for anxiety and depression   Was on effexor 37.5mg daily. Dose was increased to 75mg daily on 3/3/2025. Depression score on 3/3 was 10, today is 1  Tolerating increased dose  Mood improved. Much more energy and motivation  No side effects from increased dose.         Review of Systems   Constitutional:  Negative for fatigue and unexpected weight change.   Respiratory:  Negative for chest tightness and shortness of breath.    Cardiovascular:  Negative for palpitations.   Psychiatric/Behavioral:  Negative for dysphoric mood. The patient is not nervous/anxious.        Objective   /80   Pulse 80   Temp (!) 96 °F (35.6 °C)   Resp 18   Ht 5' (1.524 m)   Wt 114 kg (252 lb 3.2 oz)   LMP 2025 (Exact Date)   SpO2 97%   BMI 49.25 kg/m²      Physical Exam  Vitals  reviewed.   Constitutional:       General: She is not in acute distress.     Appearance: She is not ill-appearing.   Cardiovascular:      Rate and Rhythm: Normal rate.   Pulmonary:      Effort: Pulmonary effort is normal.   Neurological:      Mental Status: She is alert and oriented to person, place, and time.   Psychiatric:         Mood and Affect: Mood normal.         Behavior: Behavior normal.         Thought Content: Thought content normal.         Judgment: Judgment normal.          75

## (undated) RX ORDER — NEOMYCIN SULFATE, POLYMYXIN B SULFATE AND DEXAMETHASONE 3.5; 10000; 1 MG/G; [USP'U]/G; MG/G
1/4 OINTMENT OPHTHALMIC TWICE A DAY
Start: 2021-12-15